# Patient Record
Sex: FEMALE | Race: WHITE | NOT HISPANIC OR LATINO | URBAN - METROPOLITAN AREA
[De-identification: names, ages, dates, MRNs, and addresses within clinical notes are randomized per-mention and may not be internally consistent; named-entity substitution may affect disease eponyms.]

---

## 2024-02-26 ENCOUNTER — OV NP (OUTPATIENT)
Dept: URBAN - METROPOLITAN AREA CLINIC 2 | Facility: CLINIC | Age: 19
End: 2024-02-26

## 2024-04-16 ENCOUNTER — OV CON (OUTPATIENT)
Dept: URBAN - METROPOLITAN AREA CLINIC 2 | Facility: CLINIC | Age: 19
End: 2024-04-16
Payer: COMMERCIAL

## 2024-04-16 VITALS
DIASTOLIC BLOOD PRESSURE: 68 MMHG | TEMPERATURE: 98.3 F | HEART RATE: 106 BPM | HEIGHT: 65 IN | BODY MASS INDEX: 22.63 KG/M2 | WEIGHT: 135.8 LBS | SYSTOLIC BLOOD PRESSURE: 111 MMHG

## 2024-04-16 DIAGNOSIS — R63.4 WEIGHT LOSS: ICD-10-CM

## 2024-04-16 DIAGNOSIS — R19.7 DIARRHEA, UNSPECIFIED TYPE: ICD-10-CM

## 2024-04-16 DIAGNOSIS — R10.9 RIGHT SIDED ABDOMINAL PAIN: ICD-10-CM

## 2024-04-16 PROCEDURE — 99204 OFFICE O/P NEW MOD 45 MIN: CPT | Performed by: NURSE PRACTITIONER

## 2024-04-16 NOTE — HPI-TODAY'S VISIT:
18yr old female seen for c/o iron deficiency anemia and abdominal pain. Patient reportHistory of iron deficiency anemia.  She does have heavy menstrual cycles.  Past 3 weeks she has been having right-sided and lower increase in bowel frequency to 5 or 6 times daily.  She denies hematochezia.  In the past 6 months she has lost about 30 pounds she does have mild heartburn but only if she eats a lot of spicy foods. She has not tried any meds to help symptoms.

## 2024-04-16 NOTE — PHYSICAL EXAM GASTROINTESTINAL
Abdomen , soft, lower abdomen mild tenderness to palpation, nondistended , no guarding or rigidity , no masses palpable , normal bowel sounds , Liver and Spleen , no hepatomegaly present , no hepatosplenomegaly , liver nontender , spleen not palpable

## 2024-08-02 ENCOUNTER — OFFICE VISIT (OUTPATIENT)
Dept: URBAN - METROPOLITAN AREA CLINIC 2 | Facility: CLINIC | Age: 19
End: 2024-08-02

## 2024-08-08 ENCOUNTER — DASHBOARD ENCOUNTERS (OUTPATIENT)
Age: 19
End: 2024-08-08

## 2024-08-08 ENCOUNTER — OFFICE VISIT (OUTPATIENT)
Dept: URBAN - METROPOLITAN AREA CLINIC 2 | Facility: CLINIC | Age: 19
End: 2024-08-08
Payer: COMMERCIAL

## 2024-08-08 VITALS
WEIGHT: 132.8 LBS | HEIGHT: 65 IN | TEMPERATURE: 98.9 F | DIASTOLIC BLOOD PRESSURE: 82 MMHG | SYSTOLIC BLOOD PRESSURE: 111 MMHG | HEART RATE: 81 BPM | BODY MASS INDEX: 22.13 KG/M2

## 2024-08-08 DIAGNOSIS — R10.13 EPIGASTRIC PAIN: ICD-10-CM

## 2024-08-08 DIAGNOSIS — R10.84 ABDOMINAL PAIN, GENERALIZED: ICD-10-CM

## 2024-08-08 DIAGNOSIS — K58.0 IRRITABLE BOWEL SYNDROME WITH DIARRHEA: ICD-10-CM

## 2024-08-08 DIAGNOSIS — Z86.2 HISTORY OF ANEMIA: ICD-10-CM

## 2024-08-08 PROBLEM — 197125005: Status: ACTIVE | Noted: 2024-08-08

## 2024-08-08 PROBLEM — 275538002: Status: ACTIVE | Noted: 2024-08-08

## 2024-08-08 PROCEDURE — 99214 OFFICE O/P EST MOD 30 MIN: CPT | Performed by: NURSE PRACTITIONER

## 2024-08-08 RX ORDER — RIFAXIMIN 550 MG/1
1 TABLET TABLET ORAL THREE TIMES A DAY
Qty: 42 TABLET | Refills: 2 | OUTPATIENT
Start: 2024-08-08 | End: 2024-09-19

## 2024-08-08 RX ORDER — PANTOPRAZOLE SODIUM 40 MG/1
1 TABLET TABLET, DELAYED RELEASE ORAL ONCE A DAY
Qty: 30 TABLET | Refills: 3 | OUTPATIENT
Start: 2024-08-08

## 2024-08-08 RX ORDER — DICYCLOMINE HYDROCHLORIDE 20 MG/1
1 TABLET TABLET ORAL
Qty: 30 | Refills: 0 | OUTPATIENT
Start: 2024-08-08 | End: 2024-08-22

## 2024-08-08 NOTE — PHYSICAL EXAM GASTROINTESTINAL
Abdomen , soft, epigastric and LUQ tenderness to palpation, nondistended , no guarding or rigidity , no masses palpable , normal bowel sounds , Liver and Spleen , no hepatosplenomegaly , liver nontender , spleen not palpable

## 2024-08-08 NOTE — HPI-TODAY'S VISIT:
Very pleasant 18-year-old female seen today in follow-up for right-sided abdominal pain and epigastric pain.  She does take over-the-counter acid reducers which do help with symptoms.  She has symptoms even if she does not eat.  She rarely uses NSAIDs.  She will have 5-6 loose bowel movements daily associated with abdominal discomfort and bloating.  The symptoms have been ongoing for over 6 months and do occur multiple days a week.  She does lose weight but feels like this is stable.  She also reports a history of anemia.  She has not had her labs drawn that we ordered at her visit in April.  She plans to go to the lab today.  She does have heavy menstrual cycles which could contribute to anemia.